# Patient Record
Sex: MALE | Race: WHITE | NOT HISPANIC OR LATINO | Employment: STUDENT | URBAN - METROPOLITAN AREA
[De-identification: names, ages, dates, MRNs, and addresses within clinical notes are randomized per-mention and may not be internally consistent; named-entity substitution may affect disease eponyms.]

---
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
## 2022-10-11 ENCOUNTER — TELEPHONE (OUTPATIENT)
Dept: FAMILY MEDICINE CLINIC | Facility: CLINIC | Age: 22
End: 2022-10-11

## 2022-10-11 NOTE — TELEPHONE ENCOUNTER
Phone call placed to pt's , no answer  LM stating we cannot complete pt's forms at this time  Asked her to call office

## 2022-10-11 NOTE — TELEPHONE ENCOUNTER
Pt's  called and states she faxed paperwork over to our office on 10/7 that needs to be filled out  Spoke to Lenka Lara who states pt has not been seen since Jan 2020  We cannot fill out the forms at this time until pt has a visit  Will call  back

## 2022-10-24 ENCOUNTER — TELEPHONE (OUTPATIENT)
Dept: FAMILY MEDICINE CLINIC | Facility: CLINIC | Age: 22
End: 2022-10-24

## 2022-10-24 NOTE — TELEPHONE ENCOUNTER
Tc Kim,  w/ [employer] called to see if pt can get a physical prior to Dec when he is currently scheduled  Pt is eligible for assistance before graduating high school but a physical is required  His previous physical was over a year ago & can't be used for the paperwork required  I told Cm valdes to see if another provider is willing to do the physical because Dr Rupal Castillo has no earlier appt

## 2022-10-26 NOTE — TELEPHONE ENCOUNTER
Lm for Carl Davis to please call to confirm which appointment she will be keeping, stated that we do need to hear back by the end of today as we cannot hold two appointment spots

## 2022-11-11 ENCOUNTER — OFFICE VISIT (OUTPATIENT)
Dept: FAMILY MEDICINE CLINIC | Facility: CLINIC | Age: 22
End: 2022-11-11

## 2022-11-11 VITALS
OXYGEN SATURATION: 97 % | HEART RATE: 80 BPM | HEIGHT: 67 IN | DIASTOLIC BLOOD PRESSURE: 70 MMHG | TEMPERATURE: 98 F | BODY MASS INDEX: 35.94 KG/M2 | SYSTOLIC BLOOD PRESSURE: 110 MMHG | WEIGHT: 229 LBS

## 2022-11-11 DIAGNOSIS — F41.1 GAD (GENERALIZED ANXIETY DISORDER): ICD-10-CM

## 2022-11-11 DIAGNOSIS — E66.9 OBESITY (BMI 35.0-39.9 WITHOUT COMORBIDITY): ICD-10-CM

## 2022-11-11 DIAGNOSIS — Z00.00 PHYSICAL EXAM, ANNUAL: Primary | ICD-10-CM

## 2022-11-11 PROBLEM — F84.0 AUTISM SPECTRUM DISORDER: Status: ACTIVE | Noted: 2022-11-11

## 2022-11-11 NOTE — PROGRESS NOTES
Name: Ning Huber      : 2000      MRN: [id number]  Encounter Provider: Pelon Knott DO  Encounter Date: 2022   Encounter department: [address] [place] [address]     1  Physical exam, annual  Comments:  JAMES [facility] completed  obtain labs  discussed healthy diet and regular exercise      2  Obesity (BMI 35 0-39 9 without comorbidity)  -     Comprehensive metabolic panel; Future  -     Lipid panel; Future  -     TSH, 3rd generation; Future    3  JOSE (generalized anxiety disorder)  Comments:  continue current meds  pt feels well on same      BMI Counseling: Body mass index is 35 87 kg/m²  The BMI is above normal  Nutrition recommendations include decreasing portion sizes and encouraging healthy choices of fruits and vegetables  Exercise recommendations include exercising 3-5 times per week  Rationale for BMI follow-up plan is due to patient being overweight or obese  Depression Screening and Follow-up Plan: Patient was screened for depression during today's encounter  They screened negative with a PHQ-2 score of 0  Subjective      HPI   Pt presents for physical exam   Doing well in general   Seeing dentist   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  OV[employer] involved  Needs JAMES Penn  Has had flu shot and covid series  tdap up to date  No complaints  Pt would like to continue prozac at current dosing  Admits he sometimes forgets to take it, but he feels he worries less on it  No sadness, hopelessness, SI/HI  Review of Systems   Constitutional: Negative for chills, fatigue, fever and unexpected weight change  HENT: Negative for congestion, ear pain, hearing loss, postnasal drip, rhinorrhea, sinus pressure, sinus pain, sore throat, trouble swallowing and voice change  Eyes: Negative for pain, redness and visual disturbance  Respiratory: Negative for cough and shortness of breath      Cardiovascular: Negative for chest pain, palpitations and leg swelling  Gastrointestinal: Negative for abdominal pain, constipation, diarrhea and nausea  Endocrine: Negative for cold intolerance, heat intolerance, polydipsia and polyuria  Genitourinary: Negative for dysuria, frequency and urgency  Musculoskeletal: Negative for arthralgias, joint swelling and myalgias  Skin: Negative for rash  No suspicious lesions   Neurological: Negative for weakness, numbness and headaches  Hematological: Negative for adenopathy  Current Outpatient Medications on File Prior to Visit   Medication Sig   • fexofenadine (ALLEGRA) 30 MG/5ML suspension Take 30 mg by mouth   • FLUoxetine (PROzac) 40 MG capsule Take 1 capsule (40 mg total) by mouth daily       Objective     /70 (BP Location: Left arm, Patient Position: Sitting, Cuff Size: Adult)   Pulse 80   Temp 98 °F (36 7 °C)   Ht 5' 7" (1 702 m)   Wt 104 kg (229 lb)   SpO2 97%   BMI 35 87 kg/m²     Physical Exam  Constitutional:       General: He is not in acute distress  Appearance: He is well-developed  He is obese  HENT:      Head: Normocephalic and atraumatic  Right Ear: Tympanic membrane, ear canal and external ear normal       Left Ear: Tympanic membrane, ear canal and external ear normal       Nose: Nose normal       Mouth/Throat:      Pharynx: No oropharyngeal exudate  Eyes:      Conjunctiva/sclera: Conjunctivae normal       Pupils: Pupils are equal, round, and reactive to light  Neck:      Thyroid: No thyromegaly  Vascular: No carotid bruit or JVD  Cardiovascular:      Rate and Rhythm: Regular rhythm  Heart sounds: S1 normal and S2 normal  No murmur heard  No friction rub  No gallop  No S3 or S4 sounds  Pulmonary:      Effort: Pulmonary effort is normal       Breath sounds: Normal breath sounds  No wheezing, rhonchi or rales  Abdominal:      General: Bowel sounds are normal  There is no distension  Palpations: Abdomen is soft  Tenderness:  There is no abdominal tenderness  Lymphadenopathy:      Cervical: No cervical adenopathy  Neurological:      Mental Status: He is alert and oriented to person, place, and time  Cranial Nerves: No cranial nerve deficit  Sensory: No sensory deficit  Deep Tendon Reflexes: Reflexes are normal and symmetric     Psychiatric:         Mood and Affect: Mood normal        Susanne Nieto DO

## 2022-11-23 DIAGNOSIS — F41.1 ANXIETY STATE: ICD-10-CM

## 2022-11-23 RX ORDER — FLUOXETINE HYDROCHLORIDE 40 MG/1
40 CAPSULE ORAL DAILY
Qty: 30 CAPSULE | Refills: 3 | Status: SHIPPED | OUTPATIENT
Start: 2022-11-23

## 2023-06-30 DIAGNOSIS — F41.1 ANXIETY STATE: ICD-10-CM

## 2023-06-30 RX ORDER — FLUOXETINE HYDROCHLORIDE 40 MG/1
CAPSULE ORAL
Qty: 30 CAPSULE | Refills: 3 | Status: SHIPPED | OUTPATIENT
Start: 2023-06-30

## 2024-03-11 DIAGNOSIS — F41.1 ANXIETY STATE: ICD-10-CM

## 2024-03-11 RX ORDER — FLUOXETINE HYDROCHLORIDE 40 MG/1
40 CAPSULE ORAL DAILY
Qty: 30 CAPSULE | Refills: 0 | Status: SHIPPED | OUTPATIENT
Start: 2024-03-11

## 2024-03-11 NOTE — TELEPHONE ENCOUNTER
Reason for call:   [x] Refill   [] Prior Auth  [] Other:     Office:   [x] PCP/Provider -   [] Specialty/Provider -     Medication: FLUoxetine (PROzac) 40 MG capsule     Dose/Frequency:   TAKE 1 CAPSULE BY MOUTH ONCE DAILY        Quantity: #30    Pharmacy: [id number] - [place] - [address] [facility] [phone]     Does the patient have enough for 3 days?   [] Yes   [x] No - Send as HP to POD

## 2024-04-16 ENCOUNTER — OFFICE VISIT (OUTPATIENT)
Dept: FAMILY MEDICINE CLINIC | Facility: CLINIC | Age: 24
End: 2024-04-16
Payer: COMMERCIAL

## 2024-04-16 VITALS
HEIGHT: 67 IN | HEART RATE: 76 BPM | BODY MASS INDEX: 35.69 KG/M2 | RESPIRATION RATE: 16 BRPM | DIASTOLIC BLOOD PRESSURE: 78 MMHG | SYSTOLIC BLOOD PRESSURE: 128 MMHG | TEMPERATURE: 97.8 F | WEIGHT: 227.4 LBS | OXYGEN SATURATION: 97 %

## 2024-04-16 DIAGNOSIS — Z00.00 PHYSICAL EXAM, ANNUAL: Primary | ICD-10-CM

## 2024-04-16 DIAGNOSIS — F84.0 AUTISM SPECTRUM DISORDER: ICD-10-CM

## 2024-04-16 DIAGNOSIS — Z23 NEED FOR TDAP VACCINATION: ICD-10-CM

## 2024-04-16 DIAGNOSIS — F41.1 GAD (GENERALIZED ANXIETY DISORDER): ICD-10-CM

## 2024-04-16 DIAGNOSIS — Z23 ENCOUNTER FOR IMMUNIZATION: ICD-10-CM

## 2024-04-16 PROCEDURE — 99213 OFFICE O/P EST LOW 20 MIN: CPT | Performed by: FAMILY MEDICINE

## 2024-04-16 PROCEDURE — 90471 IMMUNIZATION ADMIN: CPT

## 2024-04-16 PROCEDURE — 90715 TDAP VACCINE 7 YRS/> IM: CPT

## 2024-04-16 PROCEDURE — 99395 PREV VISIT EST AGE 18-39: CPT | Performed by: FAMILY MEDICINE

## 2024-04-16 NOTE — PROGRESS NOTES
Name: Fritz Solorio      : 2000      MRN: [id number]  Encounter Provider: Sandrine Hernadnez DO  Encounter Date: 2024   Encounter department: [facility]    Assessment & Plan     1. Physical exam, annual  Comments:  healthy diet and exercise  check labs  tdap today  Orders:  -     CBC and differential; Future  -     Comprehensive metabolic panel; Future  -     Lipid panel; Future  -     TSH, 3rd generation; Future  -     Ferritin; Future    2. Encounter for immunization  -     TDAP VACCINE GREATER THAN OR EQUAL TO 8YO IM    3. Need for Tdap vaccination  -     TDAP VACCINE GREATER THAN OR EQUAL TO 8YO IM    4. JOSE (generalized anxiety disorder)  Comments:  doing well on prozac  would like to continue same    5. Autism spectrum disorder           Subjective      HPI  Pt presents for physical and f/u.  Working for [employer] at [employer].  Feels well.  Very active at work.  Due for tdap and labs.  Sees dentist    Pt feels like prozac is working well.  Doesn't overworry.  Not as bothered by loud noises.  Feels well emotionally and doesn't want to stop medicine.  No depression, sadness, hopelessness.        Review of Systems   Constitutional:  Negative for chills, fatigue, fever and unexpected weight change.   HENT:  Negative for congestion, ear pain, hearing loss, postnasal drip, rhinorrhea, sinus pressure, sinus pain, sore throat, trouble swallowing and voice change.    Eyes:  Negative for pain, redness and visual disturbance.   Respiratory:  Negative for cough and shortness of breath.    Cardiovascular:  Negative for chest pain, palpitations and leg swelling.   Gastrointestinal:  Negative for abdominal pain, constipation, diarrhea and nausea.   Endocrine: Negative for cold intolerance, heat intolerance, polydipsia and polyuria.   Genitourinary:  Negative for dysuria, frequency and urgency.   Musculoskeletal:  Negative for arthralgias, joint swelling and myalgias.   Skin:  Negative  "for rash.        No suspicious lesions   Neurological:  Negative for weakness, numbness and headaches.   Hematological:  Negative for adenopathy.       Current Outpatient Medications on File Prior to Visit   Medication Sig    FLUoxetine (PROzac) 40 MG capsule Take 1 capsule (40 mg total) by mouth daily    fexofenadine (ALLEGRA) 30 MG/5ML suspension Take 30 mg by mouth (Patient not taking: Reported on 4/16/2024)       Objective     /78   Pulse 76   Temp 97.8 °F (36.6 °C) (Temporal)   Resp 16   Ht 5' 7\" (1.702 m)   Wt 103 kg (227 lb 6.4 oz)   SpO2 97%   BMI 35.62 kg/m²     Physical Exam  Constitutional:       General: He is not in acute distress.     Appearance: Normal appearance. He is well-developed.   HENT:      Head: Normocephalic and atraumatic.      Right Ear: Tympanic membrane, ear canal and external ear normal.      Left Ear: Tympanic membrane, ear canal and external ear normal.      Nose: Nose normal.      Mouth/Throat:      Mouth: Mucous membranes are moist.      Pharynx: Oropharynx is clear. No posterior oropharyngeal erythema.   Eyes:      Extraocular Movements: Extraocular movements intact.      Conjunctiva/sclera: Conjunctivae normal.      Pupils: Pupils are equal, round, and reactive to light.   Neck:      Thyroid: No thyromegaly.      Vascular: No carotid bruit or JVD.   Cardiovascular:      Rate and Rhythm: Normal rate and regular rhythm.      Heart sounds: S1 normal and S2 normal. No murmur heard.     No friction rub. No gallop. No S3 or S4 sounds.   Pulmonary:      Effort: Pulmonary effort is normal.      Breath sounds: Normal breath sounds. No wheezing, rhonchi or rales.   Abdominal:      General: Bowel sounds are normal. There is no distension.      Palpations: Abdomen is soft.      Tenderness: There is no abdominal tenderness.   Musculoskeletal:      Cervical back: Neck supple.   Lymphadenopathy:      Cervical: No cervical adenopathy.   Neurological:      General: No focal deficit " present.      Mental Status: He is alert and oriented to person, place, and time. Mental status is at baseline.      Cranial Nerves: No cranial nerve deficit.      Sensory: No sensory deficit.      Deep Tendon Reflexes: Reflexes are normal and symmetric. Reflexes normal.       Sandrine Hernandez, DO

## 2024-04-27 DIAGNOSIS — F41.1 ANXIETY STATE: ICD-10-CM

## 2024-04-27 RX ORDER — FLUOXETINE HYDROCHLORIDE 40 MG/1
40 CAPSULE ORAL DAILY
Qty: 30 CAPSULE | Refills: 5 | Status: SHIPPED | OUTPATIENT
Start: 2024-04-27

## 2024-05-07 ENCOUNTER — TELEPHONE (OUTPATIENT)
Age: 24
End: 2024-05-07

## 2024-05-07 NOTE — TELEPHONE ENCOUNTER
The patients mother called this morning because he received a summons for jury duty.  The patient is diagnosed with autism.  Mom would like a note that states that the patient can not attend jury duty due to his autism diagnosis.  Please advise.

## 2025-06-13 DIAGNOSIS — F41.1 ANXIETY STATE: ICD-10-CM

## 2025-06-13 RX ORDER — FLUOXETINE HYDROCHLORIDE 40 MG/1
40 CAPSULE ORAL DAILY
Qty: 30 CAPSULE | Refills: 0 | Status: SHIPPED | OUTPATIENT
Start: 2025-06-13

## 2025-07-17 DIAGNOSIS — F41.1 ANXIETY STATE: ICD-10-CM

## 2025-07-17 RX ORDER — FLUOXETINE HYDROCHLORIDE 40 MG/1
40 CAPSULE ORAL DAILY
Qty: 30 CAPSULE | Refills: 0 | Status: SHIPPED | OUTPATIENT
Start: 2025-07-17

## 2025-07-18 ENCOUNTER — TELEPHONE (OUTPATIENT)
Dept: FAMILY MEDICINE CLINIC | Facility: CLINIC | Age: 25
End: 2025-07-18

## 2025-07-18 NOTE — TELEPHONE ENCOUNTER
Called pt lm that appt for 7/25/25 needs to be r/s'd because it was scheduled incorrectly. Pt needs a 30 min slot and it is scheduled as 15 mins.

## 2025-07-25 ENCOUNTER — TELEPHONE (OUTPATIENT)
Dept: FAMILY MEDICINE CLINIC | Facility: CLINIC | Age: 25
End: 2025-07-25